# Patient Record
Sex: FEMALE | Race: AMERICAN INDIAN OR ALASKA NATIVE
[De-identification: names, ages, dates, MRNs, and addresses within clinical notes are randomized per-mention and may not be internally consistent; named-entity substitution may affect disease eponyms.]

---

## 2017-11-20 NOTE — MAMMOGRAPHY REPORT
BILATERAL DIGITAL SCREENING MAMMOGRAM with CAD : 11/20/17 09:14:00



CLINICAL: Routine screening.Right breast cysts confirmed 11/08/16.



COMPARISON:11/08/16 and 10/10/16



FINDINGS: The breasts are heterogeneously dense, which may obscure 

small masses.Right inferior circumscribed densities are unchanged 

compared to the prior mammogram and are consistent with cysts. No mass, 

architectural distortion or suspicious calcifications.



IMPRESSION: No mammographic evidence of malignancy.



BI-RADS CATEGORY:  2 -- Benign



RECOMMENDATION: Routine mammographic screening in one year.



COMMENT:

Patient follow-up letters are generated by our [employer] application.

## 2018-05-22 NOTE — XRAY REPORT
XRAY LEFT HAND THREE VIEWS: 05/22/18 15:18:00





CLINICAL: Trauma.  She slammed her hand in a door.



FINDINGS: Normal bones and joints.  No fracture or dislocation.  Soft 

tissue swelling of the dorsum of the hand and a 3 x 2 mm rectangular 

shaped radiopaque foreign body between the first and second metatarsals 

on 2 views.



IMPRESSION: No fracture or dislocation.  Foreign body in the soft 

tissues.

## 2019-12-27 ENCOUNTER — HOSPITAL ENCOUNTER (OUTPATIENT)
Dept: HOSPITAL 5 - SPVWC | Age: 49
Discharge: HOME | End: 2019-12-27
Attending: INTERNAL MEDICINE
Payer: COMMERCIAL

## 2019-12-27 DIAGNOSIS — Z12.31: Primary | ICD-10-CM

## 2019-12-27 PROCEDURE — 77067 SCR MAMMO BI INCL CAD: CPT

## 2019-12-27 NOTE — MAMMOGRAPHY REPORT
DIGITAL SCREENING MAMMOGRAM WITH CAD, 12/27/2019



INDICATION: Routine screening mammography. Previous exam confirm the right lower cysts.



TECHNIQUE:  Digital bilateral  2D mammography was obtained in the craniocaudal and mediolateral obliq
ue projections. This examination was interpreted with the benefit of Computer-Aided Detection analysi
s.



COMPARISON: 12/26/2018 and 11/20/2017



FINDINGS: 



Breast Density: The breasts are heterogeneously dense, which may obscure small masses.



There is no evidence of dominant mass, suspicious calcifications or architectural distortion in eithe
r breast.



IMPRESSION: No mammographic evidence of malignancy.





Follow up recommendation: Routine yearly



BI-RADS Category 2:  Benign.



A "normal" or negative report should not discourage follow up or biopsy of a clinically significant f
inding.



A written summary of these findings will be mailed to the patient. The patient will be entered into a
 mammography reporting system which will generate a reminder letter for the patient's next appointmen
t at the appropriate interval.



The American College of Radiology recommends yearly mammograms starting at age 40 and continuing as l
attila as a woman is in good health.  Breast MRI is recommended for women with an approximate 20-25% or 
greater lifetime risk of breast cancer, including women with a strong family history of breast or ova
eden cancer or who have been treated for Hodgkin's disease.



Signer Name: Chris Montero MD 

Signed: 12/27/2019 4:00 PM

 Workstation Name: [id number]

## 2020-12-28 ENCOUNTER — HOSPITAL ENCOUNTER (OUTPATIENT)
Dept: HOSPITAL 5 - SPVWC | Age: 50
Discharge: HOME | End: 2020-12-28
Attending: OBSTETRICS & GYNECOLOGY
Payer: COMMERCIAL

## 2020-12-28 DIAGNOSIS — N63.15: Primary | ICD-10-CM

## 2020-12-28 DIAGNOSIS — R89.9: ICD-10-CM

## 2020-12-28 PROCEDURE — 77066 DX MAMMO INCL CAD BI: CPT

## 2020-12-28 NOTE — MAMMOGRAPHY REPORT
DIGITAL DIAGNOSTIC MAMMOGRAM WITH CAD , 12/28/2020



CLINICAL INFORMATION / INDICATION: Right yellow nipple discharge.



TECHNIQUE:  Digital bilateral mammographic imaging was performed. Spot compression views were obtaine
d.

This examination was interpreted with the benefit of Computer-aided Detection analysis. 



COMPARISON: 12/26/2018



FINDINGS: 



Breast Density: There are scattered areas of fibroglandular density.



No dominant mass, suspicious calcifications or architectural distortion in the left breast. 



Stable small oval well-circumscribed nodules have developed in the deep retroareolar and 6:00 positio
n of the right breast. This will need to be further evaluated with right breast ultrasound.



IMPRESSION: New right breast nodularity in the deep retroareolar and 6:00 position. Recommend right b
reast ultrasound for further evaluation.



Follow up recommendation: Ultrasound



BI-RADS Category 0: Incomplete. Needs additional imaging evaluation and/or prior mammograms for janee li.



-------------------------------------------------------------------------------------------

A "normal" or negative report should not discourage follow up or biopsy of a clinically significant f
inding.



A written summary of these findings will be mailed to the patient. The patient will be entered into a
 mammography reporting system which will generate a reminder letter for the patient's next appointmen
t at the appropriate interval.



According to the American College of [employer]iology, yearly mammograms are recommended starting at age 40 
and continuing as long as a woman is in good health.  Breast MRI is recommended for women with an elvin
roximately 20-25% or greater lifetime risk of breast cancer, including women with a strong family his
tory of breast or ovarian cancer and women who have been treated for Hodgkin's disease.



Signer Name: Shamika Noel MD 

Signed: 12/28/2020 3:57 PM

Workstation Name: [id number]

## 2021-01-12 ENCOUNTER — HOSPITAL ENCOUNTER (OUTPATIENT)
Dept: HOSPITAL 5 - US | Age: 51
Discharge: HOME | End: 2021-01-12
Attending: OBSTETRICS & GYNECOLOGY
Payer: COMMERCIAL

## 2021-01-12 DIAGNOSIS — N60.01: Primary | ICD-10-CM

## 2021-01-12 NOTE — ULTRASOUND REPORT
ULTRASOUND BREAST RIGHT LIMITED, 1/12/2021



CLINICAL INFORMATION / INDICATION: Follow-up of abnormal screening mammogram.



TECHNIQUE: Targeted ultrasound evaluation was performed of the area of interest.  



COMPARISON: 12/28/2020, 12/27/2019



FINDINGS: 

A simple cyst is seen immediately deep to the right nipple in the 6:00 position measuring 6 x 6 x 4 m
m. Another simple cyst is seen in the 8:00 position 4 cm from the nipple measuring 9 x 5 x 8 mm. No o
ther significant abnormality.





IMPRESSION: No sonographic evidence of malignancy.



Follow up recommendation: Routine yearly



BI-RADS Category 2:  Benign.





-------------------------------------------------------------------------------------------

A normal or "negative" report should not preclude biopsy or follow-up of a clinically suspicious find
ing.



Signer Name: Kyaw Rich MD 

Signed: 1/12/2021 10:55 AM

Workstation Name: [id number]

## 2021-07-13 ENCOUNTER — HOSPITAL ENCOUNTER (OUTPATIENT)
Dept: HOSPITAL 5 - MAMMO | Age: 51
Discharge: HOME | End: 2021-07-13
Attending: SURGERY
Payer: COMMERCIAL

## 2021-07-13 DIAGNOSIS — N60.01: Primary | ICD-10-CM

## 2021-07-13 NOTE — MAMMOGRAPHY REPORT
RIGHT DIGITAL DIAGNOSTIC MAMMOGRAM WITH CAD , 7/13/2021

RIGHT LIMITED BREAST ULTRASOUND

 

CLINICAL INFORMATION / INDICATION: This is a short-term follow-up for abnormal findings in the right 
breast. Patient states she no longer experiences nipple discharge following administration of antibio
tics. The technologist did state that a yellow nipple discharge did occur during compression. R92.2



TECHNIQUE: Digital right mammographic imaging was performed. Limited ultrasound was performed. This e
xamination was interpreted with the benefit of Computer-Aided Detection (CAD) analysis. 



COMPARISON: Prior mammogram 12/27/2019 and 12/28/2020



FINDINGS: 



Breast Density: There are scattered areas of fibroglandular density.



MAMMOGRAPHIC FINDINGS: Nodularity noted in the deep retroareolar and inferior right breast persists n
ot appreciably changed from 12/27/2019 mammogram.. Overall, no interval change.



ULTRASOUND FINDINGS: Targeted ultrasound evaluation was performed of the area of interest.   Sonograp
hic evaluation of the right breast shows a dilated duct containing debris in the subareolar breast. T
here is a simple cyst in the 8:00 position, 4 cm from nipple measuring 6 mm, as noted on prior study.
 Additionally, there are multiple small benign-appearing solid nodules throughout the 7-8:00 position
 of the right breast, 4 cm from nipple. These solid small nodules are seen to correlate with mammogra
phic nodularity  in the inferior right breast as noted on multiple prior mammograms. Due to the stabi
lity mammographically, no further follow up or imaging is suggested.





IMPRESSION: No mammographic or sonographic evidence of malignancy in the right breast. Stable right b
reast nodularity and fibrocystic change.



Follow up recommendation: Routine yearly



BI-RADS Category 2:  Benign.



-------------------------------------------------------------------------------------------

A "normal" or negative report should not discourage follow up or biopsy of a clinically significant f
inding.



A written summary of these findings will be mailed to the patient. The patient will be entered into a
 mammography reporting system which will generate a reminder letter for the patient's next appointmen
t at the appropriate interval.



According to the American College of Radiology, yearly mammograms are recommended starting at age 40 
and continuing as long as a woman is in good health.  Breast MRI is recommended for women with an elvin
roximately 20-25% or greater lifetime risk of breast cancer, including women with a strong family his
tory of breast or ovarian cancer and women who have been treated for Hodgkin's disease.



Signer Name: Shamika Noel MD 

Signed: 7/13/2021 4:11 PM

Workstation Name: [id number]

## 2022-07-14 ENCOUNTER — HOSPITAL ENCOUNTER (OUTPATIENT)
Dept: HOSPITAL 5 - MAMMO | Age: 52
Discharge: HOME | End: 2022-07-14
Attending: OBSTETRICS & GYNECOLOGY
Payer: COMMERCIAL

## 2022-07-14 DIAGNOSIS — Z12.31: Primary | ICD-10-CM

## 2022-07-14 PROCEDURE — 77067 SCR MAMMO BI INCL CAD: CPT

## 2022-07-18 NOTE — MAMMOGRAPHY REPORT
DIGITAL SCREENING MAMMOGRAM WITH CAD, 7/14/2022



CLINICAL INFORMATION / INDICATION: Routine screening mammography.



TECHNIQUE: Digital bilateral 2D mammography was obtained in the craniocaudal and mediolateral oblique
 projections. This examination was interpreted with the benefit of Computer-Aided Detection analysis.




COMPARISON: 12/28/2020 and 7/13/2021.



FINDINGS: 



Breast Density: There are scattered areas of fibroglandular density.



No dominant mass, suspicious calcifications, or architectural distortion in either breast. 



Benign-appearing nodularity inferiorly on the right is again identified. No new abnormality is seen.

 

IMPRESSION: No mammographic evidence of malignancy.



Follow up recommendation: Routine yearly screening mammogram.



BI-RADS Category 2:  BENIGN.





-------------------------------------------------------------------------------------------

A "normal" or negative report should not discourage follow up or biopsy of a clinically significant f
inding.



A written summary of these findings will be mailed to the patient. The patient will be entered into a
 mammography reporting system which will generate a reminder letter for the patient's next appointmen
t at the appropriate interval.



The American [employer] of [employer] recommends yearly mammograms starting at age 40 and continuing as l
attila as a woman is in good health.  Breast MRI is recommended for women with an approximate 20-25% or 
greater lifetime risk of breast cancer, including women with a strong family history of breast or ova
eden cancer or who have been treated for Hodgkin's disease.



Signer Name: Milton Avilez MD 

Signed: 7/18/2022 9:27 AM

Workstation Name: [email or web address]